# Patient Record
Sex: MALE | Race: WHITE | NOT HISPANIC OR LATINO | Employment: FULL TIME | URBAN - METROPOLITAN AREA
[De-identification: names, ages, dates, MRNs, and addresses within clinical notes are randomized per-mention and may not be internally consistent; named-entity substitution may affect disease eponyms.]

---

## 2022-08-03 ENCOUNTER — APPOINTMENT (OUTPATIENT)
Dept: CT IMAGING | Facility: HOSPITAL | Age: 27
End: 2022-08-03

## 2022-08-03 ENCOUNTER — HOSPITAL ENCOUNTER (EMERGENCY)
Facility: HOSPITAL | Age: 27
Discharge: HOME | End: 2022-08-03
Attending: STUDENT IN AN ORGANIZED HEALTH CARE EDUCATION/TRAINING PROGRAM | Admitting: STUDENT IN AN ORGANIZED HEALTH CARE EDUCATION/TRAINING PROGRAM

## 2022-08-03 VITALS
DIASTOLIC BLOOD PRESSURE: 85 MMHG | BODY MASS INDEX: 34.36 KG/M2 | HEART RATE: 88 BPM | WEIGHT: 282.19 LBS | TEMPERATURE: 98.2 F | RESPIRATION RATE: 18 BRPM | SYSTOLIC BLOOD PRESSURE: 129 MMHG | OXYGEN SATURATION: 95 % | HEIGHT: 76 IN

## 2022-08-03 DIAGNOSIS — K57.92 DIVERTICULITIS: Primary | ICD-10-CM

## 2022-08-03 LAB
ALBUMIN SERPL-MCNC: 4.7 G/DL (ref 3.5–5.2)
ALBUMIN/GLOB SERPL: 1.4 G/DL
ALP SERPL-CCNC: 81 U/L (ref 39–117)
ALT SERPL W P-5'-P-CCNC: 58 U/L (ref 1–41)
ANION GAP SERPL CALCULATED.3IONS-SCNC: 7.9 MMOL/L (ref 5–15)
AST SERPL-CCNC: 28 U/L (ref 1–40)
BACTERIA UR QL AUTO: ABNORMAL /HPF
BASOPHILS # BLD AUTO: 0.04 10*3/MM3 (ref 0–0.2)
BASOPHILS NFR BLD AUTO: 0.3 % (ref 0–1.5)
BILIRUB SERPL-MCNC: 0.9 MG/DL (ref 0–1.2)
BILIRUB UR QL STRIP: NEGATIVE
BUN SERPL-MCNC: 12 MG/DL (ref 6–20)
BUN/CREAT SERPL: 10.5 (ref 7–25)
CALCIUM SPEC-SCNC: 10 MG/DL (ref 8.6–10.5)
CHLORIDE SERPL-SCNC: 100 MMOL/L (ref 98–107)
CLARITY UR: CLEAR
CO2 SERPL-SCNC: 28.1 MMOL/L (ref 22–29)
COLOR UR: ABNORMAL
CREAT SERPL-MCNC: 1.14 MG/DL (ref 0.76–1.27)
DEPRECATED RDW RBC AUTO: 40.9 FL (ref 37–54)
EGFRCR SERPLBLD CKD-EPI 2021: 89.3 ML/MIN/1.73
EOSINOPHIL # BLD AUTO: 0.15 10*3/MM3 (ref 0–0.4)
EOSINOPHIL NFR BLD AUTO: 1.1 % (ref 0.3–6.2)
ERYTHROCYTE [DISTWIDTH] IN BLOOD BY AUTOMATED COUNT: 13.2 % (ref 12.3–15.4)
GLOBULIN UR ELPH-MCNC: 3.3 GM/DL
GLUCOSE SERPL-MCNC: 115 MG/DL (ref 65–99)
GLUCOSE UR STRIP-MCNC: NEGATIVE MG/DL
HCT VFR BLD AUTO: 48.3 % (ref 37.5–51)
HGB BLD-MCNC: 16.6 G/DL (ref 13–17.7)
HGB UR QL STRIP.AUTO: NEGATIVE
HOLD SPECIMEN: NORMAL
HOLD SPECIMEN: NORMAL
HYALINE CASTS UR QL AUTO: ABNORMAL /LPF
IMM GRANULOCYTES # BLD AUTO: 0.07 10*3/MM3 (ref 0–0.05)
IMM GRANULOCYTES NFR BLD AUTO: 0.5 % (ref 0–0.5)
KETONES UR QL STRIP: NEGATIVE
LEUKOCYTE ESTERASE UR QL STRIP.AUTO: ABNORMAL
LIPASE SERPL-CCNC: 28 U/L (ref 13–60)
LYMPHOCYTES # BLD AUTO: 2.11 10*3/MM3 (ref 0.7–3.1)
LYMPHOCYTES NFR BLD AUTO: 16 % (ref 19.6–45.3)
MCH RBC QN AUTO: 29.7 PG (ref 26.6–33)
MCHC RBC AUTO-ENTMCNC: 34.4 G/DL (ref 31.5–35.7)
MCV RBC AUTO: 86.4 FL (ref 79–97)
MONOCYTES # BLD AUTO: 0.91 10*3/MM3 (ref 0.1–0.9)
MONOCYTES NFR BLD AUTO: 6.9 % (ref 5–12)
NEUTROPHILS NFR BLD AUTO: 75.2 % (ref 42.7–76)
NEUTROPHILS NFR BLD AUTO: 9.88 10*3/MM3 (ref 1.7–7)
NITRITE UR QL STRIP: NEGATIVE
NRBC BLD AUTO-RTO: 0 /100 WBC (ref 0–0.2)
PH UR STRIP.AUTO: 5.5 [PH] (ref 5–8)
PLATELET # BLD AUTO: 271 10*3/MM3 (ref 140–450)
PMV BLD AUTO: 9.1 FL (ref 6–12)
POTASSIUM SERPL-SCNC: 4.5 MMOL/L (ref 3.5–5.2)
PROT SERPL-MCNC: 8 G/DL (ref 6–8.5)
PROT UR QL STRIP: NEGATIVE
RBC # BLD AUTO: 5.59 10*6/MM3 (ref 4.14–5.8)
RBC # UR STRIP: ABNORMAL /HPF
REF LAB TEST METHOD: ABNORMAL
SODIUM SERPL-SCNC: 136 MMOL/L (ref 136–145)
SP GR UR STRIP: 1.02 (ref 1–1.03)
SQUAMOUS #/AREA URNS HPF: ABNORMAL /HPF
UROBILINOGEN UR QL STRIP: ABNORMAL
WBC # UR STRIP: ABNORMAL /HPF
WBC NRBC COR # BLD: 13.16 10*3/MM3 (ref 3.4–10.8)
WHOLE BLOOD HOLD COAG: NORMAL
WHOLE BLOOD HOLD SPECIMEN: NORMAL

## 2022-08-03 PROCEDURE — 36415 COLL VENOUS BLD VENIPUNCTURE: CPT

## 2022-08-03 PROCEDURE — 83690 ASSAY OF LIPASE: CPT | Performed by: EMERGENCY MEDICINE

## 2022-08-03 PROCEDURE — 85025 COMPLETE CBC W/AUTO DIFF WBC: CPT | Performed by: EMERGENCY MEDICINE

## 2022-08-03 PROCEDURE — 81001 URINALYSIS AUTO W/SCOPE: CPT | Performed by: STUDENT IN AN ORGANIZED HEALTH CARE EDUCATION/TRAINING PROGRAM

## 2022-08-03 PROCEDURE — 0 IOPAMIDOL PER 1 ML: Performed by: STUDENT IN AN ORGANIZED HEALTH CARE EDUCATION/TRAINING PROGRAM

## 2022-08-03 PROCEDURE — 99283 EMERGENCY DEPT VISIT LOW MDM: CPT

## 2022-08-03 PROCEDURE — 80053 COMPREHEN METABOLIC PANEL: CPT | Performed by: EMERGENCY MEDICINE

## 2022-08-03 PROCEDURE — 74177 CT ABD & PELVIS W/CONTRAST: CPT

## 2022-08-03 PROCEDURE — 25010000002 MORPHINE PER 10 MG: Performed by: STUDENT IN AN ORGANIZED HEALTH CARE EDUCATION/TRAINING PROGRAM

## 2022-08-03 PROCEDURE — 96374 THER/PROPH/DIAG INJ IV PUSH: CPT

## 2022-08-03 RX ORDER — SODIUM CHLORIDE 0.9 % (FLUSH) 0.9 %
10 SYRINGE (ML) INJECTION AS NEEDED
Status: DISCONTINUED | OUTPATIENT
Start: 2022-08-03 | End: 2022-08-03 | Stop reason: HOSPADM

## 2022-08-03 RX ORDER — AMOXICILLIN AND CLAVULANATE POTASSIUM 875; 125 MG/1; MG/1
1 TABLET, FILM COATED ORAL 2 TIMES DAILY
Qty: 20 TABLET | Refills: 0 | Status: SHIPPED | OUTPATIENT
Start: 2022-08-03 | End: 2022-08-13

## 2022-08-03 RX ORDER — METRONIDAZOLE 500 MG/1
500 TABLET ORAL 3 TIMES DAILY
Qty: 30 TABLET | Refills: 0 | Status: SHIPPED | OUTPATIENT
Start: 2022-08-03 | End: 2022-08-13

## 2022-08-03 RX ORDER — HYDROCODONE BITARTRATE AND ACETAMINOPHEN 7.5; 325 MG/1; MG/1
1 TABLET ORAL EVERY 6 HOURS PRN
Qty: 12 TABLET | Refills: 0 | Status: SHIPPED | OUTPATIENT
Start: 2022-08-03 | End: 2022-08-06

## 2022-08-03 RX ADMIN — IOPAMIDOL 100 ML: 755 INJECTION, SOLUTION INTRAVENOUS at 12:41

## 2022-08-03 RX ADMIN — MORPHINE SULFATE 4 MG: 4 INJECTION INTRAVENOUS at 12:05

## 2022-08-03 NOTE — DISCHARGE INSTRUCTIONS
Take medication as prescribed.  Return to the emergency department if you have fever, vomiting, or worsening pain.

## 2022-08-03 NOTE — ED PROVIDER NOTES
"Time: 11:50 AM EDT  Arrived by: private car  Chief Complaint: abdominal pain  History provided by: patient  History is limited by: none    History of Present Illness:  Patient is a 27 y.o. year old male who presents to the emergency department with abdominal pain    Patient arrives to ED via private car. Patient has a medical history of diveriticulitis. Patient has no history of smoking, no current alcohol use, and no current drug use.    Patient states he started having abdominal pain and cramping a couple weeks ago but he was able to \"fight through\" the symptoms at that time. Patient denies symptoms of fever and vomiting. Today (08/03/2022), patient states the same symptoms started again yesterday and worsened in severity throughout the night. Patient states the pain and cramping is always in the left lower quadrant of his abdomen and symptoms are similar to divriculitis symptoms he's experienced in the past. Patient states he has had diveriticulitis two times in the past and has also had a colonoscopy procedure.       History provided by:  Patient   used: No        Similar Symptoms Previously: yes  Recently seen: none      Patient Care Team  Primary Care Provider: Amy Jorgensen APRN    Past Medical History:     Allergies   Allergen Reactions   • Sulfa Antibiotics Hives     Past Medical History:   Diagnosis Date   • Diverticulitis      History reviewed. No pertinent surgical history.  History reviewed. No pertinent family history.    Home Medications:  Prior to Admission medications    Not on File        Social History:   Social History     Tobacco Use   • Smoking status: Never Smoker   Substance Use Topics   • Alcohol use: Not Currently   • Drug use: Not Currently     Recent travel: not applicable    Review of Systems:  Review of Systems   Constitutional: Negative for chills and fever.   HENT: Negative for congestion, rhinorrhea and sore throat.    Eyes: Negative for pain and visual " "disturbance.   Respiratory: Negative for apnea, cough, chest tightness and shortness of breath.    Cardiovascular: Negative for chest pain and palpitations.   Gastrointestinal: Positive for abdominal pain (left lower quadrant). Negative for diarrhea, nausea and vomiting.   Genitourinary: Negative for difficulty urinating and dysuria.   Musculoskeletal: Negative for joint swelling and myalgias.   Skin: Negative for color change.   Neurological: Negative for seizures and headaches.   Psychiatric/Behavioral: Negative.    All other systems reviewed and are negative.       Physical Exam:  /68   Pulse 88   Temp 98.2 °F (36.8 °C) (Oral)   Resp 18   Ht 193 cm (76\")   Wt 128 kg (282 lb 3 oz)   SpO2 97%   BMI 34.35 kg/m²     Physical Exam  Vitals and nursing note reviewed.   Constitutional:       General: He is not in acute distress.     Appearance: Normal appearance. He is not toxic-appearing.   HENT:      Head: Normocephalic and atraumatic.      Jaw: There is normal jaw occlusion.   Eyes:      General: Lids are normal.      Extraocular Movements: Extraocular movements intact.      Conjunctiva/sclera: Conjunctivae normal.      Pupils: Pupils are equal, round, and reactive to light.   Cardiovascular:      Rate and Rhythm: Normal rate and regular rhythm.      Pulses: Normal pulses.      Heart sounds: Normal heart sounds.   Pulmonary:      Effort: Pulmonary effort is normal. No respiratory distress.      Breath sounds: Normal breath sounds. No wheezing or rhonchi.   Abdominal:      General: Abdomen is flat.      Palpations: Abdomen is soft.      Tenderness: There is abdominal tenderness in the left lower quadrant. There is no guarding or rebound.   Musculoskeletal:         General: Normal range of motion.      Cervical back: Normal range of motion and neck supple.      Right lower leg: No edema.      Left lower leg: No edema.   Skin:     General: Skin is warm and dry.   Neurological:      Mental Status: He is alert " and oriented to person, place, and time. Mental status is at baseline.   Psychiatric:         Mood and Affect: Mood normal.                Medications in the Emergency Department:  Medications   sodium chloride 0.9 % flush 10 mL (has no administration in time range)   morphine injection 4 mg (4 mg Intravenous Given 8/3/22 1205)   iopamidol (ISOVUE-370) 76 % injection 100 mL (100 mL Intravenous Given 8/3/22 1241)        Labs  Lab Results (last 24 hours)     Procedure Component Value Units Date/Time    CBC & Differential [[id number]]  (Abnormal) Collected: 08/03/22 1017    Specimen: Blood Updated: 08/03/22 1038    Narrative:      The following orders were created for panel order CBC & Differential.  Procedure                               Abnormality         Status                     ---------                               -----------         ------                     CBC Auto Differential[[id number]]        Abnormal            Final result                 Please view results for these tests on the individual orders.    Comprehensive Metabolic Panel [[id number]]  (Abnormal) Collected: 08/03/22 1017    Specimen: Blood Updated: 08/03/22 1056     Glucose 115 mg/dL      BUN 12 mg/dL      Creatinine 1.14 mg/dL      Sodium 136 mmol/L      Potassium 4.5 mmol/L      Chloride 100 mmol/L      CO2 28.1 mmol/L      Calcium 10.0 mg/dL      Total Protein 8.0 g/dL      Albumin 4.70 g/dL      ALT (SGPT) 58 U/L      AST (SGOT) 28 U/L      Alkaline Phosphatase 81 U/L      Total Bilirubin 0.9 mg/dL      Globulin 3.3 gm/dL      A/G Ratio 1.4 g/dL      BUN/Creatinine Ratio 10.5     Anion Gap 7.9 mmol/L      eGFR 89.3 mL/min/1.73      Comment: National Kidney Foundation and American Society of Nephrology (ASN) Task Force recommended calculation based on the Chronic Kidney Disease Epidemiology Collaboration (CKD-EPI) equation refit without adjustment for race.       Narrative:      GFR Normal >60  Chronic Kidney Disease <60  Kidney Failure  <15      Lipase [[id number]]  (Normal) Collected: 08/03/22 1017    Specimen: Blood Updated: 08/03/22 1056     Lipase 28 U/L     CBC Auto Differential [[id number]]  (Abnormal) Collected: 08/03/22 1017    Specimen: Blood Updated: 08/03/22 1038     WBC 13.16 10*3/mm3      RBC 5.59 10*6/mm3      Hemoglobin 16.6 g/dL      Hematocrit 48.3 %      MCV 86.4 fL      MCH 29.7 pg      MCHC 34.4 g/dL      RDW 13.2 %      RDW-SD 40.9 fl      MPV 9.1 fL      Platelets 271 10*3/mm3      Neutrophil % 75.2 %      Lymphocyte % 16.0 %      Monocyte % 6.9 %      Eosinophil % 1.1 %      Basophil % 0.3 %      Immature Grans % 0.5 %      Neutrophils, Absolute 9.88 10*3/mm3      Lymphocytes, Absolute 2.11 10*3/mm3      Monocytes, Absolute 0.91 10*3/mm3      Eosinophils, Absolute 0.15 10*3/mm3      Basophils, Absolute 0.04 10*3/mm3      Immature Grans, Absolute 0.07 10*3/mm3      nRBC 0.0 /100 WBC     Urinalysis With Microscopic If Indicated (No Culture) - Urine, Clean Catch [[id number]]  (Abnormal) Collected: 08/03/22 1302    Specimen: Urine, Clean Catch Updated: 08/03/22 1325     Color, UA Dark Yellow     Appearance, UA Clear     pH, UA 5.5     Specific Gravity, UA 1.024     Glucose, UA Negative     Ketones, UA Negative     Bilirubin, UA Negative     Blood, UA Negative     Protein, UA Negative     Leuk Esterase, UA Trace     Nitrite, UA Negative     Urobilinogen, UA 1.0 E.U./dL    Urinalysis, Microscopic Only - Urine, Clean Catch [[id number]]  (Abnormal) Collected: 08/03/22 1302    Specimen: Urine, Clean Catch Updated: 08/03/22 1325     RBC, UA 0-2 /HPF      WBC, UA 6-12 /HPF      Bacteria, UA None Seen /HPF      Squamous Epithelial Cells, UA 0-2 /HPF      Hyaline Casts, UA 3-6 /LPF      Methodology Automated Microscopy           Imaging:  CT Abdomen Pelvis With Contrast    Result Date: 8/3/2022  PROCEDURE: CT ABDOMEN PELVIS W CONTRAST  COMPARISON: [facility], CT, ABDOMEN/PELVIS WITH CONTRAST, 7/15/2019, 11:41.  INDICATIONS: h/o  of diverticulitis/LLQ PAIN  TECHNIQUE: After obtaining the patient's consent, CT images were created with non-ionic intravenous contrast material.   PROTOCOL:   Standard imaging protocol performed    RADIATION:   DLP: 1036.7mGy*cm   Automated exposure control was utilized to minimize radiation dose. CONTRAST: 100cc Isovue 370 I.V.  FINDINGS:  Mild bibasilar airspace opacity likely represents atelectasis.  The liver, gallbladder, spleen, pancreas and adrenal glands appear unremarkable.  In the inferior right kidney is a nonobstructing 0.4 cm stone.  Both kidneys otherwise appear unremarkable.  No ureteral stone is seen.  Several sub cm retroperitoneal lymph nodes are similar to the previous exam.  No pelvic adenopathy is seen.  The urinary bladder, prostate and seminal vesicles appear normal.  Colonic diverticulosis is noted.  There is moderate inflammatory change at the junction of the descending and sigmoid colon likely representing acute diverticulitis.  Adjacent stranding is noted.  The lack of oral contrast limits evaluation of the bowel mucosa.  The appendix appears normal.  No hernia is identified.  No focal osseous lesion is seen.        1. Acute diverticulitis involving the junction of the descending and sigmoid colon.  No evidence of abscess or perforation. 2. 0.4 cm nonobstructing right renal stone     Stephen Alejandro M.D.       Electronically Signed and Approved By: Stephen Alejandro M.D. on 8/03/2022 at 13:06               Procedures:  Procedures    Progress                            Medical Decision Making:  MDM  Number of Diagnoses or Management Options  Diverticulitis  Diagnosis management comments: ddx: Pancreatitis, small bowel obstruction, cholecystitis, cholangitis, cholelithiasis, appendicitis, diverticulitis    Slight leukocytosis.  CT shows diverticulitis without sign of perforation or abscess.  Patient prescribed medication and return precautions.       Amount and/or Complexity of Data  Reviewed  Clinical lab tests: reviewed  Tests in the radiology section of CPT®: reviewed  Tests in the medicine section of CPT®: reviewed         Final diagnoses:   Diverticulitis        Disposition:  ED Disposition     ED Disposition   Discharge    Condition   Stable    Comment   --             This medical record created using voice recognition software and a virtual scribe.    Documentation assistance provided by Tessie Trevino acting as scribe for Dr. Merritt Hinkle MD. Information recorded by the scribe was done at my direction and has been verified and validated by me.            Tessie Trevino  08/03/22 1212       Prisca Wadsworth MD  08/03/22 1414

## 2022-08-29 ENCOUNTER — CLINICAL SUPPORT (OUTPATIENT)
Dept: GASTROENTEROLOGY | Facility: CLINIC | Age: 27
End: 2022-08-29

## 2022-08-29 ENCOUNTER — TELEPHONE (OUTPATIENT)
Dept: GASTROENTEROLOGY | Facility: CLINIC | Age: 27
End: 2022-08-29

## 2022-08-29 RX ORDER — VENLAFAXINE HYDROCHLORIDE 225 MG/1
225 TABLET, EXTENDED RELEASE ORAL DAILY
COMMUNITY
Start: 2022-07-25

## 2022-08-29 NOTE — TELEPHONE ENCOUNTER
Spoke with patient regarding scheduling EGD for diagnosis of Gerd, patient states since the referral was sent he has began having other symptoms and has been diagnosed with Diverticulitis I advised patient due to the symptoms that it would be best to schedule him with a APRN rather than scheduling an EGD, patient stated understanding and agreed. Transferred him to [place] to be scheduled.

## 2022-10-19 ENCOUNTER — OFFICE VISIT (OUTPATIENT)
Dept: GASTROENTEROLOGY | Facility: CLINIC | Age: 27
End: 2022-10-19

## 2022-10-19 VITALS
BODY MASS INDEX: 34.46 KG/M2 | HEART RATE: 102 BPM | DIASTOLIC BLOOD PRESSURE: 66 MMHG | WEIGHT: 283 LBS | HEIGHT: 76 IN | SYSTOLIC BLOOD PRESSURE: 115 MMHG

## 2022-10-19 DIAGNOSIS — K57.92 DIVERTICULITIS: Primary | ICD-10-CM

## 2022-10-19 DIAGNOSIS — R10.13 DYSPEPSIA: ICD-10-CM

## 2022-10-19 DIAGNOSIS — R19.7 DIARRHEA, UNSPECIFIED TYPE: ICD-10-CM

## 2022-10-19 DIAGNOSIS — R10.9 ABDOMINAL CRAMPING: ICD-10-CM

## 2022-10-19 PROCEDURE — 99204 OFFICE O/P NEW MOD 45 MIN: CPT | Performed by: NURSE PRACTITIONER

## 2022-10-19 RX ORDER — SOD SULF/POT CHLORIDE/MAG SULF 1.479 G
12 TABLET ORAL TAKE AS DIRECTED
Qty: 24 TABLET | Refills: 0 | Status: SHIPPED | OUTPATIENT
Start: 2022-10-19 | End: 2023-03-03 | Stop reason: SDUPTHER

## 2022-10-19 RX ORDER — DICYCLOMINE HCL 20 MG
20 TABLET ORAL EVERY 6 HOURS PRN
Qty: 120 TABLET | Refills: 1 | Status: SHIPPED | OUTPATIENT
Start: 2022-10-19 | End: 2023-03-02

## 2022-10-19 NOTE — PROGRESS NOTES
Chief Complaint     Heartburn and Diverticulitis    History of Present Illness     Satya Dasilva is a 27 y.o. male who presents to [facility] on referral from KEYANNA Myers for a gastroenterology evaluation of heartburn and diverticulitis.      He reports first episode of diverticulitis in 2013.  He then experienced another episode in 2020 and then the most recent episode in August.      Reports experiencing diarrhea since started an SSRI in 2018.  Medication was changed in 2020 with no improvement in diarrhea.      Admits frequent belching that have a foul odor.  Will experience explosive diarrhea that's present a few times per week.  Will occasionally see bleeding when wiping.  Admits a history of hemorrhoids.      Denies family history of IBD.  Maternal grandmother with colon cancer dx at age 70.       History      Past Medical History:   Diagnosis Date   • Diverticulitis    • Diverticulitis of colon    • GERD (gastroesophageal reflux disease)        Past Surgical History:   Procedure Laterality Date   • COLONOSCOPY         Family History   Problem Relation Age of Onset   • Colon cancer Maternal Grandmother         Current Medications        Current Outpatient Medications:   •  venlafaxine 225 MG tablet sustained-release 24 hour 24 hr tablet, Take 225 mg by mouth Daily., Disp: , Rfl:   •  dicyclomine (BENTYL) 20 MG tablet, Take 1 tablet by mouth Every 6 (Six) Hours As Needed (abdominal pain and diarrhea)., Disp: 120 tablet, Rfl: 1  •  Sodium Sulfate-Mag Sulfate-KCl (Sutab) 0014-278-934 MG tablet, Take 12 tablets by mouth Take As Directed. Take 12 tablets at 6 pm and 12 tablets 4 hours prior to procedure., Disp: 24 tablet, Rfl: 0     Allergies     Allergies   Allergen Reactions   • Sulfa Antibiotics Hives       Social History       Social History     Social History Narrative   • Not on file       Immunizations     Immunization:  Immunization History  "  Administered Date(s) Administered   • COVID-19 (PFIZER) PURPLE CAP 09/27/2021          Objective     Objective     Vital Signs:   /66 (BP Location: Left arm, Patient Position: Sitting, Cuff Size: Adult)   Pulse 102   Ht 193 cm (76\")   Wt 128 kg (283 lb)   BMI 34.45 kg/m²       Physical Exam  Constitutional:       General: He is not in acute distress.     Appearance: Normal appearance. He is well-developed and normal weight.   HENT:      Head: Normocephalic and atraumatic.   Eyes:      Conjunctiva/sclera: Conjunctivae normal.      Pupils: Pupils are equal, round, and reactive to light.      Visual Fields: Right eye visual fields normal and left eye visual fields normal.   Cardiovascular:      Rate and Rhythm: Normal rate and regular rhythm.      Heart sounds: Normal heart sounds.   Pulmonary:      Effort: Pulmonary effort is normal. No retractions.      Breath sounds: Normal breath sounds and air entry.   Abdominal:      General: Bowel sounds are normal.      Palpations: Abdomen is soft.      Tenderness: There is no abdominal tenderness.      Comments: No appreciable hepatosplenomegaly or ascites   Musculoskeletal:         General: Normal range of motion.      Cervical back: Neck supple.      Right lower leg: No edema.      Left lower leg: No edema.   Lymphadenopathy:      Cervical: No cervical adenopathy.   Skin:     General: Skin is warm and dry.      Findings: No lesion.   Neurological:      General: No focal deficit present.      Mental Status: He is alert and oriented to person, place, and time.   Psychiatric:         Mood and Affect: Mood and affect normal.         Behavior: Behavior normal.         Results      Result Review :   The following data was reviewed by: KEYANNA Martinez on 10/19/2022:    CBC w/diff    CBC w/Diff 8/3/22   WBC 13.16 (A)   RBC 5.59   Hemoglobin 16.6   Hematocrit 48.3   MCV 86.4   MCH 29.7   MCHC 34.4   RDW 13.2   Platelets 271   Neutrophil Rel % 75.2   Immature " Granulocyte Rel % 0.5   Lymphocyte Rel % 16.0 (A)   Monocyte Rel % 6.9   Eosinophil Rel % 1.1   Basophil Rel % 0.3   (A) Abnormal value            CMP    CMP 8/3/22   Glucose 115 (A)   BUN 12   Creatinine 1.14   Sodium 136   Potassium 4.5   Chloride 100   Calcium 10.0   Albumin 4.70   Total Bilirubin 0.9   Alkaline Phosphatase 81   AST (SGOT) 28   ALT (SGPT) 58 (A)   (A) Abnormal value              Lipase   Lipase   Date Value Ref Range Status   08/03/2022 28 13 - 60 U/L Final     8/3/2022 CT abdomen pelvis-liver, gallbladder, spleen and pancreas are unremarkable colonic diverticulosis is noted.  There is moderate inflammatory change at the junction of the descending and sigmoid colon likely representing acute diverticulitis.  Adjacent stranding is noted.  Lack of oral contrast limits evaluation of bowel mucosa.         Assessment and Plan        Assessment and Plan    Diagnoses and all orders for this visit:    1. Diverticulitis (Primary)  -     Case Request; Standing  -     Case Request    2. Dyspepsia  -     Case Request; Standing  -     Case Request    3. Diarrhea, unspecified type  -     Cancel: Clostridioides difficile Toxin - Stool, Per Rectum; Future  -     Cancel: Enteric Bacterial Panel - Stool, Per Rectum; Future  -     Cancel: Enteric Parasite Panel - Stool, Per Rectum; Future  -     Cancel: Fecal Lactoferrin Qual. - Stool, Per Rectum; Future  -     Cancel: H. Pylori Antigen, Stool - Stool, Per Rectum  -     Case Request; Standing  -     Case Request  -     Clostridioides difficile Toxin - Stool, Per Rectum; Future  -     Enteric Bacterial Panel - Stool, Per Rectum; Future  -     Enteric Parasite Panel - Stool, Per Rectum; Future  -     Fecal Lactoferrin Qual. - Stool, Per Rectum; Future  -     H. Pylori Antigen, Stool - Stool, Per Rectum; Future    4. Abdominal cramping  -     dicyclomine (BENTYL) 20 MG tablet; Take 1 tablet by mouth Every 6 (Six) Hours As Needed (abdominal pain and diarrhea).   Dispense: 120 tablet; Refill: 1  -     Case Request; Standing  -     Case Request    Other orders  -     Sodium Sulfate-Mag Sulfate-KCl (Sutab) [id number] MG tablet; Take 12 tablets by mouth Take As Directed. Take 12 tablets at 6 pm and 12 tablets 4 hours prior to procedure.  Dispense: 24 tablet; Refill: 0  -     Follow Anesthesia Guidelines / Protocol; Future  -     Verify NPO; Standing  -     Verify Bowel Prep Was Successful; Standing  -     Give Tap Water Enema If Bowel Prep Insufficient; Standing        ESOPHAGOGASTRODUODENOSCOPY (N/A), COLONOSCOPY (N/A)  The risk of the endoscopy were discussed in detail. Possible risks/complications, benefits, and alternatives to surgical or invasive procedure have been explained to patient and/or legal guardian; risks include bleeding, infection, and perforation. Patient has been evaluated and can tolerate anesthesia and/or sedation.       Follow Up        Follow Up   Return for F/U after procedure.  Patient was given instructions and counseling regarding his condition or for health maintenance advice. Please see specific information pulled into the AVS if appropriate.

## 2022-10-21 ENCOUNTER — PATIENT ROUNDING (BHMG ONLY) (OUTPATIENT)
Dept: GASTROENTEROLOGY | Facility: CLINIC | Age: 27
End: 2022-10-21

## 2022-10-21 NOTE — PROGRESS NOTES
10/21/2022      Hello, may I speak with Satya Dasilva     My name is Kourtney, I am the Clinical Coordinator. I am calling from [facility].    Before we get started may I verify your date of birth? 1995    I am calling to officially welcome you to our practice and ask about your recent visit. Is this a good time to talk? No patient did not answer.    Tell me about your visit with us. What things went well?         We're always looking for ways to make our patients' experiences even better. Do you have recommendations on ways we may improve?    Overall were you satisfied with your first visit to our practice?    I appreciate you taking the time to speak with me today. Is there anything else I can do for you?    I'm glad to hear that you had a very good visit. We would really appreciate you completing a survey if you receive one either in the mail, email or text.       Thank you, and have a great day.

## 2022-11-09 ENCOUNTER — TELEPHONE (OUTPATIENT)
Dept: GASTROENTEROLOGY | Facility: CLINIC | Age: 27
End: 2022-11-09

## 2022-11-17 ENCOUNTER — OFFICE VISIT (OUTPATIENT)
Dept: ORTHOPEDIC SURGERY | Facility: CLINIC | Age: 27
End: 2022-11-17

## 2022-11-17 VITALS — HEIGHT: 76 IN | OXYGEN SATURATION: 98 % | HEART RATE: 74 BPM | WEIGHT: 275 LBS | BODY MASS INDEX: 33.49 KG/M2

## 2022-11-17 DIAGNOSIS — M75.41 IMPINGEMENT SYNDROME OF RIGHT SHOULDER: ICD-10-CM

## 2022-11-17 DIAGNOSIS — M75.81 RIGHT ROTATOR CUFF TENDONITIS: ICD-10-CM

## 2022-11-17 DIAGNOSIS — M25.511 RIGHT SHOULDER PAIN, UNSPECIFIED CHRONICITY: Primary | ICD-10-CM

## 2022-11-17 PROCEDURE — 99204 OFFICE O/P NEW MOD 45 MIN: CPT | Performed by: ORTHOPAEDIC SURGERY

## 2022-11-17 RX ORDER — DICLOFENAC SODIUM 75 MG/1
75 TABLET, DELAYED RELEASE ORAL 2 TIMES DAILY
Qty: 60 TABLET | Refills: 1 | Status: SHIPPED | OUTPATIENT
Start: 2022-11-17 | End: 2023-03-02

## 2022-11-17 RX ORDER — METHYLPREDNISOLONE 4 MG/1
TABLET ORAL
Qty: 21 TABLET | Refills: 0 | Status: SHIPPED | OUTPATIENT
Start: 2022-11-17 | End: 2023-03-02

## 2022-11-23 NOTE — TELEPHONE ENCOUNTER
Left message with patient asking for a returned call to follow up on overdue results for stool studies.

## 2023-02-20 ENCOUNTER — TELEPHONE (OUTPATIENT)
Dept: GASTROENTEROLOGY | Facility: CLINIC | Age: 28
End: 2023-02-20
Payer: COMMERCIAL

## 2023-03-03 RX ORDER — SOD SULF/POT CHLORIDE/MAG SULF 1.479 G
12 TABLET ORAL TAKE AS DIRECTED
Qty: 24 TABLET | Refills: 0 | Status: ON HOLD | OUTPATIENT
Start: 2023-03-03 | End: 2023-03-06

## 2023-03-03 NOTE — TELEPHONE ENCOUNTER
Patient called the office, he is scheduled for scopes on Monday 03/06/2023. At the time of his visit we sent Sutab to [facility].  Patient never called to have it sent to his address.    Asked if we can please send the bowel prep to [facility] in [place].

## 2023-03-06 ENCOUNTER — ANESTHESIA (OUTPATIENT)
Dept: GASTROENTEROLOGY | Facility: HOSPITAL | Age: 28
End: 2023-03-06
Payer: COMMERCIAL

## 2023-03-06 ENCOUNTER — HOSPITAL ENCOUNTER (OUTPATIENT)
Facility: HOSPITAL | Age: 28
Setting detail: HOSPITAL OUTPATIENT SURGERY
Discharge: HOME | End: 2023-03-06
Attending: INTERNAL MEDICINE | Admitting: INTERNAL MEDICINE
Payer: COMMERCIAL

## 2023-03-06 ENCOUNTER — ANESTHESIA EVENT (OUTPATIENT)
Dept: GASTROENTEROLOGY | Facility: HOSPITAL | Age: 28
End: 2023-03-06
Payer: COMMERCIAL

## 2023-03-06 VITALS
BODY MASS INDEX: 33.24 KG/M2 | HEIGHT: 76 IN | RESPIRATION RATE: 15 BRPM | DIASTOLIC BLOOD PRESSURE: 74 MMHG | HEART RATE: 77 BPM | WEIGHT: 272.93 LBS | TEMPERATURE: 97.9 F | OXYGEN SATURATION: 95 % | SYSTOLIC BLOOD PRESSURE: 118 MMHG

## 2023-03-06 DIAGNOSIS — R19.7 DIARRHEA, UNSPECIFIED TYPE: ICD-10-CM

## 2023-03-06 DIAGNOSIS — R10.13 DYSPEPSIA: ICD-10-CM

## 2023-03-06 DIAGNOSIS — R10.9 ABDOMINAL CRAMPING: ICD-10-CM

## 2023-03-06 DIAGNOSIS — K57.92 DIVERTICULITIS: ICD-10-CM

## 2023-03-06 PROCEDURE — 45385 COLONOSCOPY W/LESION REMOVAL: CPT | Performed by: INTERNAL MEDICINE

## 2023-03-06 PROCEDURE — 25010000002 PROPOFOL 10 MG/ML EMULSION: Performed by: NURSE ANESTHETIST, CERTIFIED REGISTERED

## 2023-03-06 PROCEDURE — 43239 EGD BIOPSY SINGLE/MULTIPLE: CPT | Performed by: INTERNAL MEDICINE

## 2023-03-06 PROCEDURE — 45380 COLONOSCOPY AND BIOPSY: CPT | Performed by: INTERNAL MEDICINE

## 2023-03-06 PROCEDURE — 88305 TISSUE EXAM BY PATHOLOGIST: CPT | Performed by: INTERNAL MEDICINE

## 2023-03-06 RX ORDER — SODIUM, POTASSIUM,MAG SULFATES 17.5-3.13G
1 SOLUTION, RECONSTITUTED, ORAL ORAL EVERY 12 HOURS
Qty: 354 ML | Refills: 0 | Status: SHIPPED | OUTPATIENT
Start: 2023-03-06

## 2023-03-06 RX ORDER — PROPOFOL 10 MG/ML
VIAL (ML) INTRAVENOUS AS NEEDED
Status: DISCONTINUED | OUTPATIENT
Start: 2023-03-06 | End: 2023-03-06 | Stop reason: SURG

## 2023-03-06 RX ORDER — LIDOCAINE HYDROCHLORIDE 20 MG/ML
INJECTION, SOLUTION EPIDURAL; INFILTRATION; INTRACAUDAL; PERINEURAL AS NEEDED
Status: DISCONTINUED | OUTPATIENT
Start: 2023-03-06 | End: 2023-03-06 | Stop reason: SURG

## 2023-03-06 RX ORDER — SODIUM CHLORIDE, SODIUM LACTATE, POTASSIUM CHLORIDE, CALCIUM CHLORIDE 600; 310; 30; 20 MG/100ML; MG/100ML; MG/100ML; MG/100ML
30 INJECTION, SOLUTION INTRAVENOUS CONTINUOUS
Status: DISCONTINUED | OUTPATIENT
Start: 2023-03-06 | End: 2023-03-06 | Stop reason: HOSPADM

## 2023-03-06 RX ORDER — PREDNISONE 20 MG/1
20 TABLET ORAL DAILY
COMMUNITY

## 2023-03-06 RX ORDER — PANTOPRAZOLE SODIUM 20 MG/1
20 TABLET, DELAYED RELEASE ORAL DAILY
Qty: 30 TABLET | Refills: 1 | Status: SHIPPED | OUTPATIENT
Start: 2023-03-06

## 2023-03-06 RX ADMIN — PROPOFOL 200 MG: 10 INJECTION, EMULSION INTRAVENOUS at 09:29

## 2023-03-06 RX ADMIN — SODIUM CHLORIDE, POTASSIUM CHLORIDE, SODIUM LACTATE AND CALCIUM CHLORIDE: 600; 310; 30; 20 INJECTION, SOLUTION INTRAVENOUS at 09:26

## 2023-03-06 RX ADMIN — LIDOCAINE HYDROCHLORIDE 100 MG: 20 INJECTION, SOLUTION EPIDURAL; INFILTRATION; INTRACAUDAL; PERINEURAL at 09:29

## 2023-03-06 RX ADMIN — PROPOFOL 250 MCG/KG/MIN: 10 INJECTION, EMULSION INTRAVENOUS at 09:29

## 2023-03-06 NOTE — ANESTHESIA PREPROCEDURE EVALUATION
Anesthesia Evaluation     Patient summary reviewed and Nursing notes reviewed                Airway   Mallampati: I  TM distance: >3 FB  Neck ROM: full  No difficulty expected  Dental      Pulmonary - negative pulmonary ROS and normal exam    breath sounds clear to auscultation  Cardiovascular - negative cardio ROS and normal exam    Rhythm: regular  Rate: normal        Neuro/Psych  (+) psychiatric history,    GI/Hepatic/Renal/Endo    (+) obesity,  GERD,  liver disease,     Musculoskeletal (-) negative ROS    Abdominal    Substance History - negative use     OB/GYN negative ob/gyn ROS         Other                        Anesthesia Plan    ASA 2     general     intravenous induction     Anesthetic plan, risks, benefits, and alternatives have been provided, discussed and informed consent has been obtained with: patient.        CODE STATUS:

## 2023-03-06 NOTE — ANESTHESIA POSTPROCEDURE EVALUATION
Patient: Satya Dasilva    Procedure Summary     Date: 03/06/23 Room / Location: [facility] ENDOSCOPY 1 / [facility]    Anesthesia Start: 0926 Anesthesia Stop: 1001    Procedures:       ESOPHAGOGASTRODUODENOSCOPY with biopsy      COLONOSCOPY with cold snare polypectomy, biopsy Diagnosis:       Diverticulitis      Dyspepsia      Diarrhea, unspecified type      Abdominal cramping      (Diverticulitis [K57.92])      (Dyspepsia [R10.13])      (Diarrhea, unspecified type [R19.7])      (Abdominal cramping [R10.9])    Surgeons: Leila Robertson MD Provider: Brayden Harris MD    Anesthesia Type: general ASA Status: 2          Anesthesia Type: general    Vitals  Vitals Value Taken Time   BP 92/61 03/06/23 1000   Temp 36.6 °C (97.8 °F) 03/06/23 1000   Pulse 87 03/06/23 1005   Resp 22 03/06/23 1000   SpO2 95 % 03/06/23 1005   Vitals shown include unvalidated device data.        Post Anesthesia Care and Evaluation    Patient location during evaluation: bedside  Patient participation: complete - patient participated  Level of consciousness: awake  Pain management: adequate    Airway patency: patent  PONV Status: none  Cardiovascular status: acceptable  Respiratory status: acceptable  Hydration status: acceptable    Comments: An Anesthesiologist personally participated in the most demanding procedures (including induction and emergence if applicable) in the anesthesia plan, monitored the course of anesthesia administration at frequent intervals and remained physically present and available for immediate diagnosis and treatment of emergencies.

## 2023-03-06 NOTE — H&P
"Pre Procedure History & Physical    Chief Complaint:   Heartburn, diarrhea, generalized abd pain, f/u of diverticulitis    Subjective     HPI:   26 yo M here for eval of heartburn, diarrhea, generalized abd pain, f/u of diverticulitis.    Past Medical History:   Past Medical History:   Diagnosis Date   • Anxiety and depression    • Diverticulitis of colon    • Fatty liver    • GERD (gastroesophageal reflux disease)        Past Surgical History:  Past Surgical History:   Procedure Laterality Date   • COLONOSCOPY     • PECTUS CARNATUM REPAIR         Family History:  Family History   Problem Relation Age of Onset   • Colon cancer Maternal Grandmother    • Malig Hyperthermia Neg Hx        Social History:   reports that he has never smoked. He has quit using smokeless tobacco. He reports that he does not currently use alcohol. He reports current drug use. Drug: Marijuana.    Medications:   Medications Prior to Admission   Medication Sig Dispense Refill Last Dose   • venlafaxine 225 MG tablet sustained-release 24 hour 24 hr tablet Take 1 tablet by mouth Daily.      • predniSONE (DELTASONE) 20 MG tablet Take 1 tablet by mouth Daily.          Allergies:  Sulfa antibiotics    ROS:    Pertinent items are noted in HPI     Objective     Blood pressure 129/85, pulse 78, temperature 97.4 °F (36.3 °C), temperature source Temporal, resp. rate 18, height 193 cm (76\"), weight 124 kg (272 lb 14.9 oz), SpO2 96 %.    Physical Exam   Constitutional: Pt is oriented to person, place, and time and well-developed, well-nourished, and in no distress.   Mouth/Throat: Oropharynx is clear and moist.   Neck: Normal range of motion.   Cardiovascular: Normal rate, regular rhythm and normal heart sounds.    Pulmonary/Chest: Effort normal and breath sounds normal.   Abdominal: Soft. Nontender  Skin: Skin is warm and dry.   Psychiatric: Mood, memory, affect and judgment normal.     Assessment & Plan     Diagnosis:  Heartburn, diarrhea, generalized abd " pain, f/u of diverticulitis    Anticipated Surgical Procedure:  EGD/colonoscoyp    The risks, benefits, and alternatives of this procedure have been discussed with the patient or the responsible party- the patient understands and agrees to proceed.

## 2023-03-07 LAB
CYTO UR: NORMAL
LAB AP CASE REPORT: NORMAL
LAB AP CLINICAL INFORMATION: NORMAL
PATH REPORT.FINAL DX SPEC: NORMAL
PATH REPORT.GROSS SPEC: NORMAL

## 2023-03-08 ENCOUNTER — TELEPHONE (OUTPATIENT)
Dept: GASTROENTEROLOGY | Facility: CLINIC | Age: 28
End: 2023-03-08
Payer: COMMERCIAL

## 2023-03-08 NOTE — TELEPHONE ENCOUNTER
----- Message from Leila Robertson MD sent at 3/7/2023  4:22 PM EST -----  Normal gastric biopsies.  No H.pylori.  Normal random colon biopsies.    Recall colonoscopy in 5 years.    Please arrange f/u appointment.

## 2023-03-08 NOTE — TELEPHONE ENCOUNTER
Spoke to pt and informed of Dr. Robertson's result note and recommendations. F/u apt scheduled with Lorin BRICEÑO on 9/12/2023. Pt does not report any GI issues or concerns at this time; Educated pt to contact office if any arise. Pt verified understanding.

## 2023-04-21 ENCOUNTER — OFFICE VISIT (OUTPATIENT)
Dept: UROLOGY | Facility: CLINIC | Age: 28
End: 2023-04-21
Payer: COMMERCIAL

## 2023-04-21 VITALS
WEIGHT: 286 LBS | HEIGHT: 76 IN | HEART RATE: 102 BPM | DIASTOLIC BLOOD PRESSURE: 81 MMHG | SYSTOLIC BLOOD PRESSURE: 129 MMHG | TEMPERATURE: 98.3 F | BODY MASS INDEX: 34.83 KG/M2

## 2023-04-21 DIAGNOSIS — Z30.09 STERILIZATION CONSULT: Primary | ICD-10-CM

## 2023-04-21 NOTE — PROGRESS NOTES
"Chief Complaint  Sterilization (Consult)    Subjective          Satya Dasilva 27 y.o. male who presents to [facility] UROLOGY  History of Present Illness  The patient is a consultation from self, for vasectomy counseling. Prior  surgeries have not been performed .Patient is  and they have 2 biological children.     The patient is counseled regarding a no scalpel vasectomy. Key points are that it should be considered irreversible even though it can be reversed, there are risks including failure to achieve sterility, recanalization, bleeding, testicular swelling and/or pain, formation of a sperm granuloma and infection. Limitations of activity post-procedure were discussed and he understands that he is not sterile immediately following the procedure. He will need to bring a semen specimen back in about 6-8 weeks to confirm the abscence of sperm and needs to use contraception until then. Patient understands this is considered an irreversible procedure and wishes have performed. Denies any urological problems or bleeding disorders.     Xiomara Quiroz, APRN  04/21/2023       Review of Systems   Constitutional: Negative for chills and fever.   Genitourinary: Negative.    Hematological: Does not bruise/bleed easily.      Objective   Vital Signs:   /81   Pulse 102   Temp 98.3 °F (36.8 °C)   Ht 193 cm (76\")   Wt 130 kg (286 lb)   BMI 34.81 kg/m²     Physical Exam  Vitals and nursing note reviewed.   Constitutional:       General: He is not in acute distress.     Appearance: Normal appearance. He is well-developed. He is not ill-appearing.      Comments: Ambulates without difficulty   Cardiovascular:      Rate and Rhythm: Normal rate and regular rhythm.   Pulmonary:      Effort: Pulmonary effort is normal.      Breath sounds: Normal breath sounds and air entry.   Genitourinary:     Testes: Normal.         Right: Mass or tenderness not present.         Left: Mass or " tenderness not present.      Epididymis:      Right: Normal.      Left: Normal.   Musculoskeletal:         General: Normal range of motion.   Skin:     General: Skin is warm and dry.   Neurological:      Mental Status: He is alert and oriented to person, place, and time.      Motor: Motor function is intact.   Psychiatric:         Mood and Affect: Mood normal.         Behavior: Behavior normal. Behavior is cooperative.         Thought Content: Thought content normal.         Judgment: Judgment normal.        Result Review :                 Assessment and Plan    Diagnoses and all orders for this visit:    1. Sterilization consult (Primary)  -     Vasectomy; Future      Patient was given instructions and counseling regarding his condition or for health maintenance advice. Please see specific information pulled into the AVS if appropriate. Instructions    • The patient is to go immediately home and lie down flat on his back with an ice pack on the scrotum. He may shower in the morning but no immersion in water for 2 weeks. He is to avoid strenuous activity for 3 days total including day of procedure and  no straddle activity for 3 weeks. He is reminded that he is not yet sterile and must use contraception until we confirm he no longer has sperm in a semen specimen to be brought to the office in 6 weeks specifically after 24 ejaculations.  He is to call for problems.  • The patient will schedule a vasectomy if he desires  to proceed.  • Handouts were provided.  • Electronically Identified Patient Education Materials provided.    All risks, benefits and alternatives to vasectomy discussed and understood. Understanding that this is considered an irreversible procedure. Written consent was obtained. Verbal and written instructions and information were provided. Schedule procedure when patient desires.  Understanding Valium x 1 dose available if desires. To call office several days prior to procedure for prescription. Must  have .         Follow Up   Return for vasectomy as scheduled.  Xiomara Quiroz, APRN

## 2023-05-19 ENCOUNTER — PROCEDURE VISIT (OUTPATIENT)
Dept: UROLOGY | Facility: CLINIC | Age: 28
End: 2023-05-19
Payer: COMMERCIAL

## 2023-05-19 DIAGNOSIS — Z30.09 STERILIZATION CONSULT: ICD-10-CM

## 2023-05-19 DIAGNOSIS — Z30.2 STERILIZATION: Primary | ICD-10-CM

## 2023-05-19 NOTE — PROGRESS NOTES
Procedure.  Vasectomy    Surgeon.  Dr. Perez    Assistant.  MsLaurence Xiomara Quiroz        Patient was placed in lithotomy position.  Thorough scrubbing her lower abdomen and external genitalia was performed.  Patient was draped in a sterile fashion.  We used 1% Xylocaine with epinephrine and 0.5% ropivacaine equal amounts and injected in the midline of scrotum.  Chinese technique was used and a nick was made in the mid scrotum with the forceps.  Right vas was grasped with a clamp and using cautery it was isolated from the blood vessels and about 2.5 cm of the vas was removed and the edges were coagulated.    Same thing was done on the left side.  Hemostasis was acquired.  Dermabond was used to seal the incision  and patient was sent home in good condition.  We used about 22 mL of local anesthesia

## 2023-08-15 ENCOUNTER — LAB (OUTPATIENT)
Dept: UROLOGY | Facility: CLINIC | Age: 28
End: 2023-08-15
Payer: COMMERCIAL

## 2023-08-15 ENCOUNTER — TELEPHONE (OUTPATIENT)
Dept: UROLOGY | Facility: CLINIC | Age: 28
End: 2023-08-15

## 2023-08-15 DIAGNOSIS — Z30.8 POSTVASECTOMY SPERM COUNT: Primary | ICD-10-CM

## 2023-09-12 ENCOUNTER — TELEPHONE (OUTPATIENT)
Dept: GASTROENTEROLOGY | Facility: CLINIC | Age: 28
End: 2023-09-12

## 2023-09-12 NOTE — TELEPHONE ENCOUNTER
I attempted to contact Satya Dasilva 1995 regarding the appointment no show with KEYANNA Henriquez on 9/12/23 at 2:45. Patient is aware that there is a 24-hour cancellation policy and understands that a no-show letter will be mailed to them at the address on file.I left a voice message if patient would like to reschedule his appointment, please call the office.

## (undated) DEVICE — Device

## (undated) DEVICE — SINGLE-USE BIOPSY FORCEPS: Brand: RADIAL JAW 4

## (undated) DEVICE — CONN JET HYDRA H20 AUXILIARY DISP

## (undated) DEVICE — THE SINGLE USE ETRAP – POLYP TRAP IS USED FOR SUCTION RETRIEVAL OF ENDOSCOPICALLY REMOVED POLYPS.: Brand: ETRAP

## (undated) DEVICE — Device: Brand: DEFENDO AIR/WATER/SUCTION AND BIOPSY VALVE

## (undated) DEVICE — SOL IRRG H2O PL/BG 1000ML STRL

## (undated) DEVICE — BLCK/BITE BLOX WO/DENTL/RIM W/STRAP 54F

## (undated) DEVICE — LINER SURG CANSTR SXN S/RIGD 1500CC

## (undated) DEVICE — SOLIDIFIER LIQLOC PLS 1500CC BT

## (undated) DEVICE — SNAR POLYP CAPTIFLEX XS/OVL 11X2.4MM 240CM 1P/U